# Patient Record
Sex: MALE | Race: BLACK OR AFRICAN AMERICAN | NOT HISPANIC OR LATINO | URBAN - METROPOLITAN AREA
[De-identification: names, ages, dates, MRNs, and addresses within clinical notes are randomized per-mention and may not be internally consistent; named-entity substitution may affect disease eponyms.]

---
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
## 2022-10-30 NOTE — ED PEDIATRIC NURSE NOTE - OBJECTIVE STATEMENT
Pt lifting weights and " felt a pop" Reporting some pain when turning body side to side. Denies any numbness/tingling. Denies difficulty ambulating.
no

## 2024-05-07 NOTE — ED PROCEDURE NOTE - CPROC ED INFORMED CONSENT1
Moderate to high risk    Benefits, risks, and possible complications of procedure explained to patient/caregiver who verbalized understanding and gave verbal consent.